# Patient Record
Sex: FEMALE | Race: WHITE | URBAN - METROPOLITAN AREA
[De-identification: names, ages, dates, MRNs, and addresses within clinical notes are randomized per-mention and may not be internally consistent; named-entity substitution may affect disease eponyms.]

---

## 2024-06-10 NOTE — ASU PATIENT PROFILE, ADULT - FALL HARM RISK - PATIENT NEEDS ASSISTANCE
No assistance needed DM (diabetes mellitus)    HLD (hyperlipidemia)    HTN (hypertension)    Obesity

## 2024-06-10 NOTE — ASU PATIENT PROFILE, ADULT - NSICDXPASTMEDICALHX_GEN_ALL_CORE_FT
PAST MEDICAL HISTORY:  Anxiety     Hyperlipidemia with target low density lipoprotein (LDL) cholesterol less than 50 mg/dL     Hypertension

## 2024-06-10 NOTE — ASU PATIENT PROFILE, ADULT - NS PRO AD PATIENT TYPE
no back pain, no gout, no musculoskeletal pain, no neck pain, and no weakness. Health Care Proxy (HCP)

## 2024-06-11 ENCOUNTER — OUTPATIENT (OUTPATIENT)
Dept: OUTPATIENT SERVICES | Facility: HOSPITAL | Age: 69
LOS: 1 days | Discharge: HOME | End: 2024-06-11
Payer: MEDICARE

## 2024-06-11 ENCOUNTER — RESULT REVIEW (OUTPATIENT)
Age: 69
End: 2024-06-11

## 2024-06-11 VITALS
HEART RATE: 88 BPM | RESPIRATION RATE: 16 BRPM | HEIGHT: 67 IN | OXYGEN SATURATION: 98 % | SYSTOLIC BLOOD PRESSURE: 121 MMHG | WEIGHT: 222.01 LBS | TEMPERATURE: 99 F | DIASTOLIC BLOOD PRESSURE: 80 MMHG

## 2024-06-11 VITALS
SYSTOLIC BLOOD PRESSURE: 118 MMHG | OXYGEN SATURATION: 99 % | RESPIRATION RATE: 16 BRPM | DIASTOLIC BLOOD PRESSURE: 71 MMHG | HEART RATE: 60 BPM

## 2024-06-11 DIAGNOSIS — I48.91 UNSPECIFIED ATRIAL FIBRILLATION: ICD-10-CM

## 2024-06-11 PROCEDURE — 93312 ECHO TRANSESOPHAGEAL: CPT

## 2024-06-11 PROCEDURE — 93320 DOPPLER ECHO COMPLETE: CPT | Mod: 26

## 2024-06-11 PROCEDURE — 93005 ELECTROCARDIOGRAM TRACING: CPT

## 2024-06-11 PROCEDURE — 93010 ELECTROCARDIOGRAM REPORT: CPT

## 2024-06-11 PROCEDURE — 92960 CARDIOVERSION ELECTRIC EXT: CPT

## 2024-06-11 PROCEDURE — 93325 DOPPLER ECHO COLOR FLOW MAPG: CPT

## 2024-06-11 PROCEDURE — 93325 DOPPLER ECHO COLOR FLOW MAPG: CPT | Mod: 26

## 2024-06-11 PROCEDURE — 93320 DOPPLER ECHO COMPLETE: CPT

## 2024-06-11 PROCEDURE — 93312 ECHO TRANSESOPHAGEAL: CPT | Mod: 26

## 2024-06-11 RX ORDER — RIVAROXABAN 15 MG-20MG
1 KIT ORAL
Refills: 0 | DISCHARGE

## 2024-06-11 RX ORDER — TELMISARTAN 20 MG/1
1 TABLET ORAL
Refills: 0 | DISCHARGE

## 2024-06-11 RX ORDER — ROSUVASTATIN CALCIUM 5 MG/1
1 TABLET ORAL
Refills: 0 | DISCHARGE

## 2024-06-11 RX ORDER — LEVOTHYROXINE SODIUM 125 MCG
1 TABLET ORAL
Refills: 0 | DISCHARGE

## 2024-06-11 RX ORDER — ALPRAZOLAM 0.25 MG
1 TABLET ORAL
Refills: 0 | DISCHARGE

## 2024-06-11 RX ORDER — DILTIAZEM HCL 120 MG
1 CAPSULE, EXT RELEASE 24 HR ORAL
Refills: 0 | DISCHARGE

## 2024-06-11 NOTE — PROCEDURAL SAFETY CHECKLIST WITH OR WITHOUT SEDATION - NSPOSTCOMMENTFT_GEN_ALL_CORE
Anesthesia start 845 Probe in 856 bubble study complete 903 , probe out  904, cardioverted at 150 jouls 904 unsuccessful ,second at 200 jouls at 905 unsuccessful, third 908 successful pt tolerated maintained safety will continue to monitor see anesthesia sheet

## 2024-06-11 NOTE — ASU PREOP CHECKLIST - SITE MARKED BY SURGEON
[Feeds doll] : feeds doll [Removes garments] : removes garments [Uses spoon/fork] : uses spoon/fork [Helps in house] : helps in house [Drink from cup] : drink from cup [Imitates activities] : imitates activities [Plays ball] : plays ball [Listens to story] : listen to story [Scribbles] : scribbles [Drinks from cup without spilling] : drinks from cup without spilling [Understands 1 step command] : understands 1 step command [Says 1-5 words] : says 1-5 words yes [Follows simple commands] : follows simple commands [Walks up steps] : walks up steps [Runs] : does not run [FreeTextEntry3] : gino segura, lan buchanan. points  to body parts. walked at 11 months. waves and claps

## 2024-06-11 NOTE — PACU DISCHARGE NOTE - COMMENTS
pt tolerated well pt and family verbalize understanding of post procedure care education emotional support provided maintained safety

## 2024-06-12 NOTE — POST DISCHARGE NOTE - DETAILS:
Post procedure phone call completed; patient understood all discharge paperwork. No questions regarding medications or pain management. MD follow up appointment made. Patient was able to rest when they were discharged. Patient will recommend [facility], no complaints of hospital stay, satisfied with care. Instructed patient to contact provider with any further questions or concerns.

## 2024-06-18 DIAGNOSIS — I48.91 UNSPECIFIED ATRIAL FIBRILLATION: ICD-10-CM

## 2024-12-19 NOTE — ASU PREOP CHECKLIST - WARM FLUIDS/WARM BLANKETS
Patient seen for an Acute care visit today.  She states that she has been having painful constipation since she last had her abdominal X-Ray done on 11/30/24.  Patient has had bowel movements since then but states that only 'drops' come out.  Pt last had a very small BM yesterday.  She is being followed by palliative care who have recommended both pain and bowel regimens for her, however she does not feel any of these medications work to relieve symptoms.  She did take two tablets of Senna yesterday, but has not been attempting to use Miralax.    CBC and CMP ordered, WNL.  Physical exam reassuring with soft abdomen that is not tender to palpation.  I recommended patient attempt to follow previous pain and bowel regimens, or adjust medications, however she stated she wanted to go to the Emergency Room due to being so uncomfortable.  Patient aware that the ER may or may not recommend any different intervention or recommendations. RN wheeled patient to ER in stable condition.  MD Chen aware.  
no